# Patient Record
Sex: MALE | Race: WHITE
[De-identification: names, ages, dates, MRNs, and addresses within clinical notes are randomized per-mention and may not be internally consistent; named-entity substitution may affect disease eponyms.]

---

## 2023-06-29 ENCOUNTER — HOSPITAL ENCOUNTER (INPATIENT)
Dept: HOSPITAL 15 - ER | Age: 63
LOS: 6 days | Discharge: HOME HEALTH | DRG: 844 | End: 2023-07-05
Attending: INTERNAL MEDICINE | Admitting: NURSE PRACTITIONER
Payer: MEDICAID

## 2023-06-29 VITALS — BODY MASS INDEX: 19.24 KG/M2 | HEIGHT: 74 IN | WEIGHT: 149.91 LBS

## 2023-06-29 DIAGNOSIS — L97.529: ICD-10-CM

## 2023-06-29 DIAGNOSIS — D64.9: ICD-10-CM

## 2023-06-29 DIAGNOSIS — Z88.8: ICD-10-CM

## 2023-06-29 DIAGNOSIS — D75.839: ICD-10-CM

## 2023-06-29 DIAGNOSIS — E88.09: ICD-10-CM

## 2023-06-29 DIAGNOSIS — T25.022A: Primary | ICD-10-CM
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
## 2023-06-29 DIAGNOSIS — E44.0: ICD-10-CM

## 2023-06-29 DIAGNOSIS — F17.200: ICD-10-CM

## 2023-06-29 DIAGNOSIS — L03.116: ICD-10-CM
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
## 2023-06-29 LAB
ALBUMIN SERPL-MCNC: 2.5 G/DL (ref 3.4–5)
ALP SERPL-CCNC: 98 U/L (ref 45–117)
ALT SERPL-CCNC: 16 U/L (ref 16–61)
ANION GAP SERPL CALCULATED.3IONS-SCNC: 7 MMOL/L (ref 5–15)
BILIRUB SERPL-MCNC: 0.1 MG/DL (ref 0.2–1)
BUN SERPL-MCNC: 13 MG/DL (ref 7–18)
BUN/CREAT SERPL: 15.7 (ref 10–20)
CALCIUM SERPL-MCNC: 7.8 MG/DL (ref 8.5–10.1)
CHLORIDE SERPL-SCNC: 102 MMOL/L (ref 98–107)
CO2 SERPL-SCNC: 26 MMOL/L (ref 21–32)
GLUCOSE SERPL-MCNC: 103 MG/DL (ref 74–106)
HCT VFR BLD AUTO: 29.1 % (ref 41–53)
HGB BLD-MCNC: 9.8 G/DL (ref 13.5–17.5)
MCH RBC QN AUTO: 29.1 PG (ref 28–32)
MCV RBC AUTO: 86.2 FL (ref 80–100)
NRBC BLD QL AUTO: 0 %
POTASSIUM SERPL-SCNC: 3.9 MMOL/L (ref 3.5–5.1)
PROT SERPL-MCNC: 6.8 G/DL (ref 6.4–8.2)
SODIUM SERPL-SCNC: 135 MMOL/L (ref 136–145)

## 2023-06-29 PROCEDURE — 96365 THER/PROPH/DIAG IV INF INIT: CPT

## 2023-06-29 PROCEDURE — 87186 SC STD MICRODIL/AGAR DIL: CPT

## 2023-06-29 PROCEDURE — 87077 CULTURE AEROBIC IDENTIFY: CPT

## 2023-06-29 PROCEDURE — 96366 THER/PROPH/DIAG IV INF ADDON: CPT

## 2023-06-29 PROCEDURE — 82565 ASSAY OF CREATININE: CPT

## 2023-06-29 PROCEDURE — 73630 X-RAY EXAM OF FOOT: CPT

## 2023-06-29 PROCEDURE — 80053 COMPREHEN METABOLIC PANEL: CPT

## 2023-06-29 PROCEDURE — 36415 COLL VENOUS BLD VENIPUNCTURE: CPT

## 2023-06-29 PROCEDURE — 80202 ASSAY OF VANCOMYCIN: CPT

## 2023-06-29 PROCEDURE — 87205 SMEAR GRAM STAIN: CPT

## 2023-06-29 PROCEDURE — 93971 EXTREMITY STUDY: CPT

## 2023-06-29 PROCEDURE — 96368 THER/DIAG CONCURRENT INF: CPT

## 2023-06-29 PROCEDURE — 85025 COMPLETE CBC W/AUTO DIFF WBC: CPT

## 2023-06-29 RX ADMIN — SODIUM CHLORIDE SCH MLS/HR: 0.9 INJECTION, SOLUTION INTRAVENOUS at 22:06

## 2023-06-30 VITALS — SYSTOLIC BLOOD PRESSURE: 166 MMHG | DIASTOLIC BLOOD PRESSURE: 98 MMHG

## 2023-06-30 VITALS — DIASTOLIC BLOOD PRESSURE: 76 MMHG | SYSTOLIC BLOOD PRESSURE: 129 MMHG

## 2023-06-30 VITALS — SYSTOLIC BLOOD PRESSURE: 149 MMHG | DIASTOLIC BLOOD PRESSURE: 96 MMHG

## 2023-06-30 VITALS — DIASTOLIC BLOOD PRESSURE: 78 MMHG | SYSTOLIC BLOOD PRESSURE: 129 MMHG

## 2023-06-30 VITALS — SYSTOLIC BLOOD PRESSURE: 125 MMHG | DIASTOLIC BLOOD PRESSURE: 78 MMHG

## 2023-06-30 VITALS — DIASTOLIC BLOOD PRESSURE: 96 MMHG | SYSTOLIC BLOOD PRESSURE: 149 MMHG

## 2023-06-30 LAB
ALBUMIN SERPL-MCNC: 2.5 G/DL (ref 3.4–5)
ALP SERPL-CCNC: 81 U/L (ref 45–117)
ALT SERPL-CCNC: 13 U/L (ref 16–61)
ANION GAP SERPL CALCULATED.3IONS-SCNC: 5 MMOL/L (ref 5–15)
BILIRUB SERPL-MCNC: 0.2 MG/DL (ref 0.2–1)
BUN SERPL-MCNC: 10 MG/DL (ref 7–18)
BUN/CREAT SERPL: 13.5 (ref 10–20)
CALCIUM SERPL-MCNC: 7.7 MG/DL (ref 8.5–10.1)
CHLORIDE SERPL-SCNC: 106 MMOL/L (ref 98–107)
CO2 SERPL-SCNC: 26 MMOL/L (ref 21–32)
GLUCOSE SERPL-MCNC: 122 MG/DL (ref 74–106)
HCT VFR BLD AUTO: 29.8 % (ref 41–53)
HGB BLD-MCNC: 9.8 G/DL (ref 13.5–17.5)
MCH RBC QN AUTO: 29.1 PG (ref 28–32)
MCV RBC AUTO: 88.1 FL (ref 80–100)
NRBC BLD QL AUTO: 0 %
POTASSIUM SERPL-SCNC: 4.4 MMOL/L (ref 3.5–5.1)
PROT SERPL-MCNC: 5.9 G/DL (ref 6.4–8.2)
SODIUM SERPL-SCNC: 137 MMOL/L (ref 136–145)

## 2023-06-30 RX ADMIN — HYDRALAZINE HYDROCHLORIDE PRN MG: 20 INJECTION INTRAMUSCULAR; INTRAVENOUS at 02:43

## 2023-06-30 RX ADMIN — CEFEPIME SCH MLS/HR: 1 INJECTION, POWDER, FOR SOLUTION INTRAMUSCULAR; INTRAVENOUS at 21:57

## 2023-06-30 RX ADMIN — SODIUM CHLORIDE SCH MLS/HR: 0.9 INJECTION, SOLUTION INTRAVENOUS at 17:07

## 2023-06-30 RX ADMIN — SODIUM HYPOCHLORITE SCH ML: 1.25 SOLUTION TOPICAL at 22:03

## 2023-06-30 RX ADMIN — ENOXAPARIN SODIUM SCH MG: 40 INJECTION SUBCUTANEOUS at 09:48

## 2023-07-01 VITALS — SYSTOLIC BLOOD PRESSURE: 145 MMHG | DIASTOLIC BLOOD PRESSURE: 91 MMHG

## 2023-07-01 VITALS — SYSTOLIC BLOOD PRESSURE: 110 MMHG | DIASTOLIC BLOOD PRESSURE: 68 MMHG

## 2023-07-01 VITALS — SYSTOLIC BLOOD PRESSURE: 125 MMHG | DIASTOLIC BLOOD PRESSURE: 88 MMHG

## 2023-07-01 VITALS — DIASTOLIC BLOOD PRESSURE: 91 MMHG | SYSTOLIC BLOOD PRESSURE: 145 MMHG

## 2023-07-01 VITALS — DIASTOLIC BLOOD PRESSURE: 102 MMHG | SYSTOLIC BLOOD PRESSURE: 157 MMHG

## 2023-07-01 VITALS — SYSTOLIC BLOOD PRESSURE: 141 MMHG | DIASTOLIC BLOOD PRESSURE: 90 MMHG

## 2023-07-01 VITALS — DIASTOLIC BLOOD PRESSURE: 88 MMHG | SYSTOLIC BLOOD PRESSURE: 125 MMHG

## 2023-07-01 RX ADMIN — SODIUM HYPOCHLORITE SCH ML: 1.25 SOLUTION TOPICAL at 10:00

## 2023-07-01 RX ADMIN — SODIUM CHLORIDE SCH MLS/HR: 0.9 INJECTION, SOLUTION INTRAVENOUS at 06:50

## 2023-07-01 RX ADMIN — SODIUM CHLORIDE SCH MLS/HR: 0.9 INJECTION, SOLUTION INTRAVENOUS at 23:43

## 2023-07-01 RX ADMIN — HYDRALAZINE HYDROCHLORIDE PRN MG: 20 INJECTION INTRAMUSCULAR; INTRAVENOUS at 06:11

## 2023-07-01 RX ADMIN — VANCOMYCIN HYDROCHLORIDE SCH MLS/HR: 1 INJECTION, POWDER, LYOPHILIZED, FOR SOLUTION INTRAVENOUS at 04:32

## 2023-07-01 RX ADMIN — ENOXAPARIN SODIUM SCH MG: 40 INJECTION SUBCUTANEOUS at 10:42

## 2023-07-01 RX ADMIN — CEFEPIME SCH MLS/HR: 1 INJECTION, POWDER, FOR SOLUTION INTRAMUSCULAR; INTRAVENOUS at 14:33

## 2023-07-01 RX ADMIN — CEFEPIME SCH MLS/HR: 1 INJECTION, POWDER, FOR SOLUTION INTRAMUSCULAR; INTRAVENOUS at 22:15

## 2023-07-01 RX ADMIN — CEFEPIME SCH MLS/HR: 1 INJECTION, POWDER, FOR SOLUTION INTRAMUSCULAR; INTRAVENOUS at 06:13

## 2023-07-01 RX ADMIN — VANCOMYCIN HYDROCHLORIDE SCH MLS/HR: 1 INJECTION, POWDER, LYOPHILIZED, FOR SOLUTION INTRAVENOUS at 17:49

## 2023-07-01 RX ADMIN — SODIUM HYPOCHLORITE SCH ML: 1.25 SOLUTION TOPICAL at 22:15

## 2023-07-02 VITALS — DIASTOLIC BLOOD PRESSURE: 94 MMHG | SYSTOLIC BLOOD PRESSURE: 137 MMHG

## 2023-07-02 VITALS — DIASTOLIC BLOOD PRESSURE: 87 MMHG | SYSTOLIC BLOOD PRESSURE: 120 MMHG

## 2023-07-02 VITALS — SYSTOLIC BLOOD PRESSURE: 120 MMHG | DIASTOLIC BLOOD PRESSURE: 87 MMHG

## 2023-07-02 VITALS — SYSTOLIC BLOOD PRESSURE: 135 MMHG | DIASTOLIC BLOOD PRESSURE: 81 MMHG

## 2023-07-02 VITALS — DIASTOLIC BLOOD PRESSURE: 91 MMHG | SYSTOLIC BLOOD PRESSURE: 136 MMHG

## 2023-07-02 RX ADMIN — VANCOMYCIN HYDROCHLORIDE SCH MLS/HR: 1 INJECTION, POWDER, LYOPHILIZED, FOR SOLUTION INTRAVENOUS at 10:13

## 2023-07-02 RX ADMIN — VANCOMYCIN HYDROCHLORIDE SCH MLS/HR: 1 INJECTION, POWDER, LYOPHILIZED, FOR SOLUTION INTRAVENOUS at 05:00

## 2023-07-02 RX ADMIN — SODIUM HYPOCHLORITE SCH ML: 1.25 SOLUTION TOPICAL at 22:00

## 2023-07-02 RX ADMIN — CEFEPIME SCH MLS/HR: 1 INJECTION, POWDER, FOR SOLUTION INTRAMUSCULAR; INTRAVENOUS at 05:43

## 2023-07-02 RX ADMIN — ENOXAPARIN SODIUM SCH MG: 40 INJECTION SUBCUTANEOUS at 10:13

## 2023-07-02 RX ADMIN — CEFEPIME SCH MLS/HR: 1 INJECTION, POWDER, FOR SOLUTION INTRAMUSCULAR; INTRAVENOUS at 15:00

## 2023-07-02 RX ADMIN — VANCOMYCIN HYDROCHLORIDE SCH MLS/HR: 1 INJECTION, POWDER, LYOPHILIZED, FOR SOLUTION INTRAVENOUS at 22:06

## 2023-07-02 RX ADMIN — CEFEPIME SCH MLS/HR: 1 INJECTION, POWDER, FOR SOLUTION INTRAMUSCULAR; INTRAVENOUS at 23:12

## 2023-07-02 RX ADMIN — SODIUM HYPOCHLORITE SCH ML: 1.25 SOLUTION TOPICAL at 10:00

## 2023-07-02 RX ADMIN — SODIUM CHLORIDE SCH MLS/HR: 0.9 INJECTION, SOLUTION INTRAVENOUS at 16:10

## 2023-07-03 VITALS — SYSTOLIC BLOOD PRESSURE: 152 MMHG | DIASTOLIC BLOOD PRESSURE: 102 MMHG

## 2023-07-03 VITALS — DIASTOLIC BLOOD PRESSURE: 96 MMHG | SYSTOLIC BLOOD PRESSURE: 153 MMHG

## 2023-07-03 VITALS — DIASTOLIC BLOOD PRESSURE: 102 MMHG | SYSTOLIC BLOOD PRESSURE: 147 MMHG

## 2023-07-03 VITALS — DIASTOLIC BLOOD PRESSURE: 93 MMHG | SYSTOLIC BLOOD PRESSURE: 133 MMHG

## 2023-07-03 VITALS — SYSTOLIC BLOOD PRESSURE: 129 MMHG | DIASTOLIC BLOOD PRESSURE: 93 MMHG

## 2023-07-03 RX ADMIN — SODIUM CHLORIDE SCH MLS/HR: 0.9 INJECTION, SOLUTION INTRAVENOUS at 10:46

## 2023-07-03 RX ADMIN — CEFEPIME SCH MLS/HR: 1 INJECTION, POWDER, FOR SOLUTION INTRAMUSCULAR; INTRAVENOUS at 15:14

## 2023-07-03 RX ADMIN — ENOXAPARIN SODIUM SCH MG: 40 INJECTION SUBCUTANEOUS at 10:47

## 2023-07-03 RX ADMIN — SODIUM HYPOCHLORITE SCH ML: 1.25 SOLUTION TOPICAL at 10:00

## 2023-07-03 RX ADMIN — CEFEPIME SCH MLS/HR: 1 INJECTION, POWDER, FOR SOLUTION INTRAMUSCULAR; INTRAVENOUS at 05:57

## 2023-07-03 RX ADMIN — VANCOMYCIN HYDROCHLORIDE SCH MLS/HR: 1 INJECTION, POWDER, LYOPHILIZED, FOR SOLUTION INTRAVENOUS at 10:46

## 2023-07-03 RX ADMIN — VANCOMYCIN HYDROCHLORIDE SCH MLS/HR: 1 INJECTION, POWDER, LYOPHILIZED, FOR SOLUTION INTRAVENOUS at 23:18

## 2023-07-04 VITALS — DIASTOLIC BLOOD PRESSURE: 89 MMHG | SYSTOLIC BLOOD PRESSURE: 143 MMHG

## 2023-07-04 VITALS — SYSTOLIC BLOOD PRESSURE: 138 MMHG | DIASTOLIC BLOOD PRESSURE: 79 MMHG

## 2023-07-04 VITALS — SYSTOLIC BLOOD PRESSURE: 140 MMHG | DIASTOLIC BLOOD PRESSURE: 91 MMHG

## 2023-07-04 VITALS — DIASTOLIC BLOOD PRESSURE: 79 MMHG | SYSTOLIC BLOOD PRESSURE: 138 MMHG

## 2023-07-04 VITALS — DIASTOLIC BLOOD PRESSURE: 97 MMHG | SYSTOLIC BLOOD PRESSURE: 148 MMHG

## 2023-07-04 VITALS — DIASTOLIC BLOOD PRESSURE: 91 MMHG | SYSTOLIC BLOOD PRESSURE: 148 MMHG

## 2023-07-04 RX ADMIN — Medication SCH GM: at 17:20

## 2023-07-04 RX ADMIN — SODIUM CHLORIDE SCH MLS/HR: 0.9 INJECTION, SOLUTION INTRAVENOUS at 01:30

## 2023-07-04 RX ADMIN — CEFEPIME SCH MLS/HR: 1 INJECTION, POWDER, FOR SOLUTION INTRAMUSCULAR; INTRAVENOUS at 13:15

## 2023-07-04 RX ADMIN — SODIUM CHLORIDE SCH MLS/HR: 0.9 INJECTION, SOLUTION INTRAVENOUS at 18:23

## 2023-07-04 RX ADMIN — CEFEPIME SCH MLS/HR: 1 INJECTION, POWDER, FOR SOLUTION INTRAMUSCULAR; INTRAVENOUS at 21:24

## 2023-07-04 RX ADMIN — CEFEPIME SCH MLS/HR: 1 INJECTION, POWDER, FOR SOLUTION INTRAMUSCULAR; INTRAVENOUS at 03:24

## 2023-07-04 RX ADMIN — VANCOMYCIN HYDROCHLORIDE SCH MLS/HR: 1 INJECTION, POWDER, LYOPHILIZED, FOR SOLUTION INTRAVENOUS at 21:24

## 2023-07-04 RX ADMIN — SODIUM HYPOCHLORITE SCH ML: 1.25 SOLUTION TOPICAL at 00:06

## 2023-07-04 RX ADMIN — ENOXAPARIN SODIUM SCH MG: 40 INJECTION SUBCUTANEOUS at 09:33

## 2023-07-04 RX ADMIN — SODIUM HYPOCHLORITE SCH ML: 1.25 SOLUTION TOPICAL at 21:24

## 2023-07-04 RX ADMIN — VANCOMYCIN HYDROCHLORIDE SCH MLS/HR: 1 INJECTION, POWDER, LYOPHILIZED, FOR SOLUTION INTRAVENOUS at 11:38

## 2023-07-04 RX ADMIN — SODIUM HYPOCHLORITE SCH ML: 1.25 SOLUTION TOPICAL at 09:33

## 2023-07-05 VITALS — DIASTOLIC BLOOD PRESSURE: 88 MMHG | SYSTOLIC BLOOD PRESSURE: 131 MMHG

## 2023-07-05 VITALS — SYSTOLIC BLOOD PRESSURE: 135 MMHG | DIASTOLIC BLOOD PRESSURE: 92 MMHG

## 2023-07-05 VITALS — SYSTOLIC BLOOD PRESSURE: 154 MMHG | DIASTOLIC BLOOD PRESSURE: 97 MMHG

## 2023-07-05 RX ADMIN — CEFEPIME SCH MLS/HR: 1 INJECTION, POWDER, FOR SOLUTION INTRAMUSCULAR; INTRAVENOUS at 05:37

## 2023-07-05 RX ADMIN — CEFEPIME SCH MLS/HR: 1 INJECTION, POWDER, FOR SOLUTION INTRAMUSCULAR; INTRAVENOUS at 13:03

## 2023-07-05 RX ADMIN — SODIUM HYPOCHLORITE SCH ML: 1.25 SOLUTION TOPICAL at 10:00

## 2023-07-05 RX ADMIN — VANCOMYCIN HYDROCHLORIDE SCH MLS/HR: 1 INJECTION, POWDER, LYOPHILIZED, FOR SOLUTION INTRAVENOUS at 10:00

## 2023-07-05 RX ADMIN — ENOXAPARIN SODIUM SCH MG: 40 INJECTION SUBCUTANEOUS at 10:00

## 2023-07-05 RX ADMIN — Medication SCH GM: at 17:06

## 2023-07-05 RX ADMIN — Medication SCH GM: at 08:00

## 2023-07-05 RX ADMIN — SODIUM CHLORIDE SCH MLS/HR: 0.9 INJECTION, SOLUTION INTRAVENOUS at 05:49

## 2023-10-02 ENCOUNTER — HOSPITAL ENCOUNTER (INPATIENT)
Dept: HOSPITAL 15 - ER | Age: 63
LOS: 4 days | Discharge: HOME | DRG: 383 | End: 2023-10-06
Attending: FAMILY MEDICINE
Payer: MEDICAID

## 2023-10-02 VITALS — RESPIRATION RATE: 14 BRPM | OXYGEN SATURATION: 97 % | HEART RATE: 84 BPM

## 2023-10-02 VITALS — HEIGHT: 72 IN | BODY MASS INDEX: 20.39 KG/M2 | WEIGHT: 150.58 LBS

## 2023-10-02 DIAGNOSIS — D64.9: ICD-10-CM

## 2023-10-02 DIAGNOSIS — I10: ICD-10-CM

## 2023-10-02 DIAGNOSIS — L03.116: Primary | ICD-10-CM
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
## 2023-10-02 DIAGNOSIS — Z60.2: ICD-10-CM

## 2023-10-02 DIAGNOSIS — E44.0: ICD-10-CM

## 2023-10-02 DIAGNOSIS — Z82.49: ICD-10-CM
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
## 2023-10-02 DIAGNOSIS — F17.210: ICD-10-CM

## 2023-10-02 DIAGNOSIS — S91.302A: ICD-10-CM

## 2023-10-02 DIAGNOSIS — Z71.6: ICD-10-CM

## 2023-10-02 DIAGNOSIS — B96.4: ICD-10-CM
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
## 2023-10-02 LAB
ALBUMIN SERPL-MCNC: 4.2 G/DL (ref 3.2–4.8)
ALP SERPL-CCNC: 90 U/L (ref 46–116)
ALT SERPL-CCNC: < 9 U/L (ref 7–40)
ANION GAP SERPL CALCULATED.3IONS-SCNC: 5 MMOL/L (ref 5–15)
BILIRUB SERPL-MCNC: 0.2 MG/DL (ref 0.2–1)
BUN SERPL-MCNC: 10 MG/DL (ref 9–23)
BUN/CREAT SERPL: 10.3 (ref 10–20)
CALCIUM SERPL-MCNC: 9.3 MG/DL (ref 8.5–10.1)
CHLORIDE SERPL-SCNC: 102 MMOL/L (ref 98–107)
CO2 SERPL-SCNC: 27 MMOL/L (ref 20–30)
GLUCOSE SERPL-MCNC: 104 MG/DL (ref 74–106)
HCT VFR BLD AUTO: 32.6 % (ref 41–53)
HGB BLD-MCNC: 10.5 G/DL (ref 13.5–17.5)
MCH RBC QN AUTO: 25.6 PG (ref 28–32)
MCV RBC AUTO: 79.4 FL (ref 80–100)
NRBC BLD QL AUTO: 0 %
POTASSIUM SERPL-SCNC: 4.2 MMOL/L (ref 3.5–5.1)
PROT SERPL-MCNC: 7.8 G/DL (ref 5.7–8.2)
SODIUM SERPL-SCNC: 134 MMOL/L (ref 136–145)

## 2023-10-02 PROCEDURE — 81001 URINALYSIS AUTO W/SCOPE: CPT

## 2023-10-02 PROCEDURE — 87186 SC STD MICRODIL/AGAR DIL: CPT

## 2023-10-02 PROCEDURE — 85610 PROTHROMBIN TIME: CPT

## 2023-10-02 PROCEDURE — 85025 COMPLETE CBC W/AUTO DIFF WBC: CPT

## 2023-10-02 PROCEDURE — 71045 X-RAY EXAM CHEST 1 VIEW: CPT

## 2023-10-02 PROCEDURE — 93005 ELECTROCARDIOGRAM TRACING: CPT

## 2023-10-02 PROCEDURE — 73700 CT LOWER EXTREMITY W/O DYE: CPT

## 2023-10-02 PROCEDURE — 87205 SMEAR GRAM STAIN: CPT

## 2023-10-02 PROCEDURE — 93926 LOWER EXTREMITY STUDY: CPT

## 2023-10-02 PROCEDURE — 85652 RBC SED RATE AUTOMATED: CPT

## 2023-10-02 PROCEDURE — 80053 COMPREHEN METABOLIC PANEL: CPT

## 2023-10-02 PROCEDURE — 85730 THROMBOPLASTIN TIME PARTIAL: CPT

## 2023-10-02 PROCEDURE — 36415 COLL VENOUS BLD VENIPUNCTURE: CPT

## 2023-10-02 PROCEDURE — 87077 CULTURE AEROBIC IDENTIFY: CPT

## 2023-10-02 PROCEDURE — 80202 ASSAY OF VANCOMYCIN: CPT

## 2023-10-02 RX ADMIN — SODIUM CHLORIDE SCH MLS/HR: 0.9 INJECTION, SOLUTION INTRAVENOUS at 17:17

## 2023-10-02 RX ADMIN — OXYCODONE HYDROCHLORIDE AND ACETAMINOPHEN SCH MG: 500 TABLET ORAL at 22:22

## 2023-10-02 SDOH — SOCIAL STABILITY - SOCIAL INSECURITY: PROBLEMS RELATED TO LIVING ALONE: Z60.2

## 2023-10-03 VITALS
SYSTOLIC BLOOD PRESSURE: 156 MMHG | DIASTOLIC BLOOD PRESSURE: 92 MMHG | OXYGEN SATURATION: 97 % | TEMPERATURE: 97.4 F | RESPIRATION RATE: 15 BRPM | HEART RATE: 62 BPM

## 2023-10-03 VITALS
HEART RATE: 60 BPM | RESPIRATION RATE: 16 BRPM | TEMPERATURE: 98.5 F | SYSTOLIC BLOOD PRESSURE: 126 MMHG | OXYGEN SATURATION: 99 % | DIASTOLIC BLOOD PRESSURE: 79 MMHG

## 2023-10-03 VITALS
TEMPERATURE: 98 F | HEART RATE: 75 BPM | DIASTOLIC BLOOD PRESSURE: 103 MMHG | RESPIRATION RATE: 18 BRPM | SYSTOLIC BLOOD PRESSURE: 125 MMHG | OXYGEN SATURATION: 97 %

## 2023-10-03 VITALS — RESPIRATION RATE: 18 BRPM | HEART RATE: 66 BPM | OXYGEN SATURATION: 98 %

## 2023-10-03 VITALS — HEART RATE: 63 BPM | SYSTOLIC BLOOD PRESSURE: 148 MMHG | DIASTOLIC BLOOD PRESSURE: 69 MMHG | RESPIRATION RATE: 18 BRPM

## 2023-10-03 VITALS — OXYGEN SATURATION: 97 % | RESPIRATION RATE: 16 BRPM

## 2023-10-03 VITALS — HEART RATE: 62 BPM | RESPIRATION RATE: 15 BRPM | OXYGEN SATURATION: 97 %

## 2023-10-03 LAB
ALBUMIN SERPL-MCNC: 3.8 G/DL (ref 3.2–4.8)
ALP SERPL-CCNC: 88 U/L (ref 46–116)
ALT SERPL-CCNC: < 9 U/L (ref 7–40)
ANION GAP SERPL CALCULATED.3IONS-SCNC: 1 MMOL/L (ref 5–15)
BILIRUB SERPL-MCNC: 0.2 MG/DL (ref 0.2–1)
BUN SERPL-MCNC: 9 MG/DL (ref 9–23)
BUN/CREAT SERPL: 11 (ref 10–20)
CALCIUM SERPL-MCNC: 8.7 MG/DL (ref 8.7–10.4)
CHLORIDE SERPL-SCNC: 108 MMOL/L (ref 98–107)
CO2 SERPL-SCNC: 30 MMOL/L (ref 20–30)
GLUCOSE SERPL-MCNC: 88 MG/DL (ref 74–106)
HCT VFR BLD AUTO: 32.6 % (ref 41–53)
HGB BLD-MCNC: 10.6 G/DL (ref 13.5–17.5)
MCH RBC QN AUTO: 26.1 PG (ref 28–32)
MCV RBC AUTO: 80 FL (ref 80–100)
NRBC BLD QL AUTO: 0 %
POTASSIUM SERPL-SCNC: 4.2 MMOL/L (ref 3.5–5.1)
PROT SERPL-MCNC: 6.9 G/DL (ref 5.7–8.2)
SODIUM SERPL-SCNC: 139 MMOL/L (ref 136–145)

## 2023-10-03 RX ADMIN — CEFAZOLIN SODIUM SCH MLS/HR: 1 INJECTION, SOLUTION INTRAVENOUS at 21:43

## 2023-10-03 RX ADMIN — VANCOMYCIN HYDROCHLORIDE SCH MLS/HR: 750 INJECTION, POWDER, LYOPHILIZED, FOR SOLUTION INTRAVENOUS at 08:57

## 2023-10-03 RX ADMIN — ENOXAPARIN SODIUM SCH MG: 40 INJECTION SUBCUTANEOUS at 08:55

## 2023-10-03 RX ADMIN — SODIUM CHLORIDE SCH MLS/HR: 0.9 INJECTION, SOLUTION INTRAVENOUS at 01:06

## 2023-10-03 RX ADMIN — SODIUM CHLORIDE SCH MLS/HR: 0.9 INJECTION, SOLUTION INTRAVENOUS at 03:31

## 2023-10-03 RX ADMIN — BENAZEPRIL HYDROCHLORIDE SCH MG: 10 TABLET, FILM COATED ORAL at 08:52

## 2023-10-03 RX ADMIN — VANCOMYCIN HYDROCHLORIDE SCH MLS/HR: 750 INJECTION, POWDER, LYOPHILIZED, FOR SOLUTION INTRAVENOUS at 20:08

## 2023-10-03 RX ADMIN — MULTIVITAMIN TABLET SCH TAB: TABLET at 08:51

## 2023-10-03 RX ADMIN — SODIUM CHLORIDE SCH MLS/HR: 0.9 INJECTION, SOLUTION INTRAVENOUS at 14:24

## 2023-10-03 RX ADMIN — OXYCODONE HYDROCHLORIDE AND ACETAMINOPHEN SCH MG: 500 TABLET ORAL at 21:43

## 2023-10-03 RX ADMIN — OXYCODONE HYDROCHLORIDE AND ACETAMINOPHEN SCH MG: 500 TABLET ORAL at 08:51

## 2023-10-03 RX ADMIN — CEFAZOLIN SODIUM SCH MLS/HR: 1 INJECTION, SOLUTION INTRAVENOUS at 14:26

## 2023-10-03 RX ADMIN — ZINC SULFATE CAP 220 MG (50 MG ELEMENTAL ZN) SCH MG: 220 (50 ZN) CAP at 08:51

## 2023-10-04 VITALS
RESPIRATION RATE: 20 BRPM | OXYGEN SATURATION: 100 % | TEMPERATURE: 98.7 F | SYSTOLIC BLOOD PRESSURE: 139 MMHG | HEART RATE: 85 BPM | DIASTOLIC BLOOD PRESSURE: 99 MMHG

## 2023-10-04 VITALS
OXYGEN SATURATION: 96 % | RESPIRATION RATE: 20 BRPM | TEMPERATURE: 98.4 F | SYSTOLIC BLOOD PRESSURE: 139 MMHG | HEART RATE: 79 BPM | DIASTOLIC BLOOD PRESSURE: 78 MMHG

## 2023-10-04 VITALS — OXYGEN SATURATION: 100 %

## 2023-10-04 LAB
ALBUMIN SERPL-MCNC: 3.3 G/DL (ref 3.2–4.8)
ALP SERPL-CCNC: 67 U/L (ref 46–116)
ALT SERPL-CCNC: < 9 U/L (ref 7–40)
ANION GAP SERPL CALCULATED.3IONS-SCNC: 6 MMOL/L (ref 5–15)
APTT PPP: 28.6 SEC (ref 24.5–34.5)
BILIRUB SERPL-MCNC: 0.2 MG/DL (ref 0.2–1)
BUN SERPL-MCNC: 14 MG/DL (ref 9–23)
BUN/CREAT SERPL: 17.3 (ref 10–20)
CALCIUM SERPL-MCNC: 8.4 MG/DL (ref 8.5–10.1)
CHLORIDE SERPL-SCNC: 110 MMOL/L (ref 98–107)
CO2 SERPL-SCNC: 24 MMOL/L (ref 20–30)
GLUCOSE SERPL-MCNC: 93 MG/DL (ref 74–106)
HCT VFR BLD AUTO: 32.5 % (ref 41–53)
HGB BLD-MCNC: 10.2 G/DL (ref 13.5–17.5)
INR PPP: 1.1 (ref 0.9–1.15)
MCH RBC QN AUTO: 25.5 PG (ref 28–32)
MCV RBC AUTO: 81.1 FL (ref 80–100)
NRBC BLD QL AUTO: 0 %
POTASSIUM SERPL-SCNC: 4.3 MMOL/L (ref 3.5–5.1)
PROT SERPL-MCNC: 5.8 G/DL (ref 5.7–8.2)
PROTHROMBIN TIME: 11.5 SEC (ref 9.3–11.8)
SODIUM SERPL-SCNC: 140 MMOL/L (ref 136–145)

## 2023-10-04 PROCEDURE — 0HBNXZZ EXCISION OF LEFT FOOT SKIN, EXTERNAL APPROACH: ICD-10-PCS | Performed by: PODIATRIST

## 2023-10-04 RX ADMIN — ENOXAPARIN SODIUM SCH MG: 40 INJECTION SUBCUTANEOUS at 09:59

## 2023-10-04 RX ADMIN — SODIUM CHLORIDE SCH MLS/HR: 0.9 INJECTION, SOLUTION INTRAVENOUS at 10:05

## 2023-10-04 RX ADMIN — OXYCODONE HYDROCHLORIDE AND ACETAMINOPHEN SCH MG: 500 TABLET ORAL at 20:26

## 2023-10-04 RX ADMIN — CEFAZOLIN SODIUM SCH MLS/HR: 1 INJECTION, SOLUTION INTRAVENOUS at 21:48

## 2023-10-04 RX ADMIN — VANCOMYCIN HYDROCHLORIDE SCH MLS/HR: 750 INJECTION, POWDER, LYOPHILIZED, FOR SOLUTION INTRAVENOUS at 20:26

## 2023-10-04 RX ADMIN — CEFAZOLIN SODIUM SCH MLS/HR: 1 INJECTION, SOLUTION INTRAVENOUS at 05:40

## 2023-10-04 RX ADMIN — SODIUM CHLORIDE SCH MLS/HR: 0.9 INJECTION, SOLUTION INTRAVENOUS at 18:45

## 2023-10-04 RX ADMIN — OXYCODONE HYDROCHLORIDE AND ACETAMINOPHEN SCH MG: 500 TABLET ORAL at 09:58

## 2023-10-04 RX ADMIN — CEFAZOLIN SODIUM SCH MLS/HR: 1 INJECTION, SOLUTION INTRAVENOUS at 14:19

## 2023-10-04 RX ADMIN — ZINC SULFATE CAP 220 MG (50 MG ELEMENTAL ZN) SCH MG: 220 (50 ZN) CAP at 09:58

## 2023-10-04 RX ADMIN — SODIUM CHLORIDE SCH MLS/HR: 0.9 INJECTION, SOLUTION INTRAVENOUS at 00:44

## 2023-10-04 RX ADMIN — MULTIVITAMIN TABLET SCH TAB: TABLET at 09:58

## 2023-10-04 RX ADMIN — BENAZEPRIL HYDROCHLORIDE SCH MG: 10 TABLET, FILM COATED ORAL at 09:58

## 2023-10-04 RX ADMIN — VANCOMYCIN HYDROCHLORIDE SCH MLS/HR: 750 INJECTION, POWDER, LYOPHILIZED, FOR SOLUTION INTRAVENOUS at 08:45

## 2023-10-05 VITALS
TEMPERATURE: 98 F | OXYGEN SATURATION: 97 % | HEART RATE: 103 BPM | RESPIRATION RATE: 18 BRPM | SYSTOLIC BLOOD PRESSURE: 137 MMHG | DIASTOLIC BLOOD PRESSURE: 89 MMHG

## 2023-10-05 VITALS — HEART RATE: 96 BPM | RESPIRATION RATE: 18 BRPM | OXYGEN SATURATION: 99 %

## 2023-10-05 RX ADMIN — OXYCODONE HYDROCHLORIDE AND ACETAMINOPHEN SCH MG: 500 TABLET ORAL at 23:07

## 2023-10-05 RX ADMIN — CEFAZOLIN SODIUM SCH MLS/HR: 1 INJECTION, SOLUTION INTRAVENOUS at 05:02

## 2023-10-05 RX ADMIN — SODIUM CHLORIDE SCH MLS/HR: 0.9 INJECTION, SOLUTION INTRAVENOUS at 20:31

## 2023-10-05 RX ADMIN — SODIUM CHLORIDE SCH MLS/HR: 0.9 INJECTION, SOLUTION INTRAVENOUS at 05:02

## 2023-10-05 RX ADMIN — VANCOMYCIN HYDROCHLORIDE SCH MLS/HR: 750 INJECTION, POWDER, LYOPHILIZED, FOR SOLUTION INTRAVENOUS at 10:45

## 2023-10-05 RX ADMIN — BENAZEPRIL HYDROCHLORIDE SCH MG: 10 TABLET, FILM COATED ORAL at 10:32

## 2023-10-05 RX ADMIN — ZINC SULFATE CAP 220 MG (50 MG ELEMENTAL ZN) SCH MG: 220 (50 ZN) CAP at 10:45

## 2023-10-05 RX ADMIN — CEFAZOLIN SODIUM SCH MLS/HR: 1 INJECTION, SOLUTION INTRAVENOUS at 14:36

## 2023-10-05 RX ADMIN — ENOXAPARIN SODIUM SCH MG: 40 INJECTION SUBCUTANEOUS at 10:45

## 2023-10-05 RX ADMIN — MULTIVITAMIN TABLET SCH TAB: TABLET at 10:45

## 2023-10-05 RX ADMIN — VANCOMYCIN HYDROCHLORIDE SCH MLS/HR: 750 INJECTION, POWDER, LYOPHILIZED, FOR SOLUTION INTRAVENOUS at 20:31

## 2023-10-05 RX ADMIN — BENAZEPRIL HYDROCHLORIDE SCH MG: 10 TABLET, FILM COATED ORAL at 15:42

## 2023-10-05 RX ADMIN — OXYCODONE HYDROCHLORIDE AND ACETAMINOPHEN SCH MG: 500 TABLET ORAL at 10:45

## 2023-10-05 RX ADMIN — SODIUM CHLORIDE SCH MLS/HR: 0.9 INJECTION, SOLUTION INTRAVENOUS at 11:25

## 2023-10-05 RX ADMIN — CEFAZOLIN SODIUM SCH MLS/HR: 1 INJECTION, SOLUTION INTRAVENOUS at 23:07

## 2023-10-06 VITALS — TEMPERATURE: 97.88 F | SYSTOLIC BLOOD PRESSURE: 150 MMHG | DIASTOLIC BLOOD PRESSURE: 63 MMHG

## 2023-10-06 VITALS — OXYGEN SATURATION: 97 % | HEART RATE: 89 BPM | RESPIRATION RATE: 17 BRPM

## 2023-10-06 VITALS
OXYGEN SATURATION: 99 % | HEART RATE: 84 BPM | DIASTOLIC BLOOD PRESSURE: 81 MMHG | TEMPERATURE: 97.9 F | SYSTOLIC BLOOD PRESSURE: 127 MMHG | RESPIRATION RATE: 18 BRPM

## 2023-10-06 VITALS
SYSTOLIC BLOOD PRESSURE: 129 MMHG | RESPIRATION RATE: 17 BRPM | TEMPERATURE: 98.4 F | OXYGEN SATURATION: 97 % | HEART RATE: 94 BPM | DIASTOLIC BLOOD PRESSURE: 84 MMHG

## 2023-10-06 RX ADMIN — OXYCODONE HYDROCHLORIDE AND ACETAMINOPHEN SCH MG: 500 TABLET ORAL at 10:05

## 2023-10-06 RX ADMIN — VANCOMYCIN HYDROCHLORIDE SCH MLS/HR: 750 INJECTION, POWDER, LYOPHILIZED, FOR SOLUTION INTRAVENOUS at 08:01

## 2023-10-06 RX ADMIN — BENAZEPRIL HYDROCHLORIDE SCH MG: 10 TABLET, FILM COATED ORAL at 10:07

## 2023-10-06 RX ADMIN — CEFAZOLIN SODIUM SCH MLS/HR: 1 INJECTION, SOLUTION INTRAVENOUS at 07:11

## 2023-10-06 RX ADMIN — ENOXAPARIN SODIUM SCH MG: 40 INJECTION SUBCUTANEOUS at 10:00

## 2023-10-06 RX ADMIN — SODIUM CHLORIDE SCH MLS/HR: 0.9 INJECTION, SOLUTION INTRAVENOUS at 04:34

## 2023-10-06 RX ADMIN — SODIUM CHLORIDE SCH MLS/HR: 0.9 INJECTION, SOLUTION INTRAVENOUS at 12:30

## 2023-10-06 RX ADMIN — MULTIVITAMIN TABLET SCH TAB: TABLET at 10:06

## 2023-10-06 RX ADMIN — ZINC SULFATE CAP 220 MG (50 MG ELEMENTAL ZN) SCH MG: 220 (50 ZN) CAP at 10:06

## 2024-12-02 ENCOUNTER — HOSPITAL ENCOUNTER (EMERGENCY)
Dept: HOSPITAL 15 - ER | Age: 64
LOS: 1 days | Discharge: HOME | End: 2024-12-03
Payer: MEDICAID

## 2024-12-02 VITALS — RESPIRATION RATE: 16 BRPM | HEART RATE: 89 BPM | TEMPERATURE: 98 F | OXYGEN SATURATION: 96 %

## 2024-12-02 VITALS — HEIGHT: 66 IN | WEIGHT: 132.28 LBS | BODY MASS INDEX: 21.26 KG/M2

## 2024-12-02 VITALS — RESPIRATION RATE: 16 BRPM | OXYGEN SATURATION: 99 % | HEART RATE: 109 BPM

## 2024-12-02 VITALS — OXYGEN SATURATION: 96 % | HEART RATE: 89 BPM | RESPIRATION RATE: 16 BRPM

## 2024-12-02 DIAGNOSIS — Z79.899: ICD-10-CM

## 2024-12-02 DIAGNOSIS — F15.90: ICD-10-CM

## 2024-12-02 DIAGNOSIS — Z88.6: ICD-10-CM

## 2024-12-02 DIAGNOSIS — F23: Primary | ICD-10-CM

## 2024-12-02 DIAGNOSIS — F17.210: ICD-10-CM

## 2024-12-02 DIAGNOSIS — Z86.2: ICD-10-CM

## 2024-12-02 DIAGNOSIS — I10: ICD-10-CM

## 2024-12-02 LAB
ANION GAP SERPL CALCULATED.3IONS-SCNC: 9 MMOL/L (ref 5–15)
BUN SERPL-MCNC: 19 MG/DL (ref 9–23)
BUN/CREAT SERPL: 17.3 (ref 10–20)
CALCIUM SERPL-MCNC: 10.1 MG/DL (ref 8.7–10.4)
CHLORIDE SERPL-SCNC: 106 MMOL/L (ref 98–107)
CO2 SERPL-SCNC: 24 MMOL/L (ref 20–31)
GLUCOSE SERPL-MCNC: 87 MG/DL (ref 74–106)
HCT VFR BLD AUTO: 40.2 % (ref 41–53)
HGB BLD-MCNC: 13.5 G/DL (ref 13.5–17.5)
MCH RBC QN AUTO: 31.1 PG (ref 28–32)
MCV RBC AUTO: 93.1 FL (ref 80–100)
NRBC BLD QL AUTO: 0.1 %
POTASSIUM SERPL-SCNC: 4.2 MMOL/L (ref 3.5–5.1)
SODIUM SERPL-SCNC: 139 MMOL/L (ref 136–145)

## 2024-12-02 PROCEDURE — 80048 BASIC METABOLIC PNL TOTAL CA: CPT

## 2024-12-02 PROCEDURE — 81001 URINALYSIS AUTO W/SCOPE: CPT

## 2024-12-02 PROCEDURE — 36415 COLL VENOUS BLD VENIPUNCTURE: CPT

## 2024-12-02 PROCEDURE — 80307 DRUG TEST PRSMV CHEM ANLYZR: CPT

## 2024-12-02 PROCEDURE — 96372 THER/PROPH/DIAG INJ SC/IM: CPT

## 2024-12-02 PROCEDURE — 85025 COMPLETE CBC W/AUTO DIFF WBC: CPT

## 2024-12-02 PROCEDURE — 80320 DRUG SCREEN QUANTALCOHOLS: CPT

## 2024-12-02 PROCEDURE — 99285 EMERGENCY DEPT VISIT HI MDM: CPT

## 2024-12-02 RX ADMIN — HALOPERIDOL LACTATE ONE MG: 5 INJECTION, SOLUTION INTRAMUSCULAR at 15:54

## 2024-12-02 RX ADMIN — DIPHENHYDRAMINE HYDROCHLORIDE ONE MG: 50 INJECTION INTRAMUSCULAR; INTRAVENOUS at 15:53

## 2024-12-02 RX ADMIN — LORAZEPAM ONE MG: 2 INJECTION, SOLUTION INTRAMUSCULAR; INTRAVENOUS at 15:51

## 2024-12-02 NOTE — ED.PDOC
History of Present Illness


HPI Comments


65 y/o M, with a Hx of anemia, HTN, and polysubstance abuse, is BIBA for c/o 

ALOC s/p seizure-like activity, today. Per EMS report, patient's neighbor called

after witnessing the patient, suddenly, laid himself next to his vehicle, while 

outside, and proceeded to have a seizure, where his body "stiffen" then. On 

scene, patient was found next to his vehicle, w/o oral trauma or incontinence, 

stating non-comprehensible sentences, with a GCS 11, blood glucose of 144, and 

all remaining vitals within normal limits. EMS states on aforementioned personal

reporting on patient acting different from his normal baseline. Patient has no 

other reported associated symptoms. Further Hx cannot be obtain, due to 

patient's current altered condition and absence of family/caretaker historians, 

at time of assessment.


Chief Complaint:  Seizure


Time Seen by MD:  15:10


Primary Care Provider:  SAMANTHA


Reviewed Notes:  Nurses Notes, Paramedic Notes, Medications, Allergies


Allergies:  


Coded Allergies:  


     Ibuprofen (Verified  Adverse Reaction, Unknown, 11/14/10)


Home Meds


Active Scripts


Acetaminophen (Tylenol) 650 Mg Rc, 650 MG PO TID, #30 SUPP.RECT


   Prov:MARIA ISABEL GOLDBERG MD         10/6/23


Ciprofloxacin Hcl (Cipro) 500 Mg Tab, 1 TAB PO BID, #60 TAB


   Prov:MARIA ISABEL GOLDBERG MD         10/6/23


Amoxicillin & Pot Clavulanate (AUGMENTIN TABLET) 875 Mg Tb, 875 MG PO BID for 10

Days, #20 TAB


   Prov:MIKIE BINGHAM MD         7/5/23


Reported Medications


Benazepril Hcl (Benazepril Hcl) 5 Mg Tab, 5 MG PO DAILY, MG


   10/3/23


Olanzapine (OLANZAPINE) 2.5 Mg Tab, 2 TAB PO DAILY for 30 Days, MG


   10/3/23


Information Source:  Patient, Emergency Med Personnel


Mode of Arrival:  EMS


Severity:  Moderate


Timing:  Hours


Duration:  Minutes


Prehospital treatment:  12 Lead EKG, Accucheck (144), Cardiac Monitor





Past Medical History


PAST MEDICAL HISTORY:  Anemia, HTN


Surgical History:  Denies all surgeries





Family History


Family History:  Family hx of HTN





Social History


Smoker:  Cigarettes


Alcohol:  Occasionally


Drugs:  Marijuana


Lives In:  Home





Constitutional:  denies: chills, diaphoresis, fatigue, fever, malaise, sweats, 

weakness, others


EENTM:  denies: blurred vision, double vision, ear bleeding, ear discharge, ear 

drainage, ear pain, ear ringing, eye pain, eye redness, hearing loss, mouth 

pain, mouth swelling, nasal discharge, nose bleeding, nose congestion, nose 

pain, photophobia, tearing, throat pain, throat swelling, voice changes, others


Respiratory:  denies: cough, hemoptysis, orthopnea, SOB at rest, shortness of 

breath, SOB with excertion, stridor, wheezing, others


Cardiovascular:  denies: chest pain, dizzy spells, diaphoresis, Dyspnea on 

exertion, edema, irregular heart beat, left arm pain, lightheadedness, 

palpitations, PND, syncope, others


Gastrointestinal:  denies: abdomen distended, abdominal pain, blood streaked 

bowels, constipated, diarrhea, dysphagia, difficulty swallowing, hematemesis, 

melena, nausea, poor appetite, poor fluid intake, rectal bleeding, rectal pain, 

vomiting, others


Genitourinary:  denies: burning, dysuria, flank pain, frequency, hematuria, 

incontinence, penile discharge, penile sore, pain, testicle pain, testicle sw

elling, urgency, others


Neurological:  reports: seizure; denies: dizziness, fainting, headache, left 

sided numbness, left sided weakness, numbness, paresthesia, pre-existing 

deficit, right sided numbness, right sided weakness, speech problems, tingling, 

tremors, weakness, others


Musculoskeletal:  denies: back pain, gout, joint pain, joint swelling, muscle 

pain, muscle stiffness, neck pain, others


Integumetry:  denies: bruises, change in color, change in hair/nails, dryness, 

laceration, lesions, lumps, rash, wounds, others


Allergic/Immunocompromised:  denies: Difficulty Healing, Frequent Infections, 

Hives, Itching, others


Hematologic/Lymphatic:  denies: anemia, blood clots, easy bleeding, easy 

bruising, swollen glands, others


Endocrine:  denies: excessive hunger, excessive sweating, excessive thirst, 

excessive urination, flushing, intolerance to cold, intolerance to heat, 

unexplained weight gain, unexplained weight loss, others


Psychiatric:  denies: anxiety, bipolar disorder, depression, hopeless, panic 

disorder, schizophrenia, sleepless, suicidal, others


All Other Systems:  Reviewed and Negative





Physical Exam


General Appearance:  No Apparent Distress


HEENT:  Normal ENT Inspection, Pharynx Normal, TMs Normal


Neck:  Full Range of Motion, Non-Tender, Normal, Normal Inspection


Respiratory:  Chest Non-Tender, Lungs Clear, No Accessory Muscle Use, No 

Respiratory Distress, Normal Breath Sounds


Cardiovascular:  No Edema, No JVD, No Murmur, No Gallop, Normal Peripheral P

ulses, Regular Rate/Rhythm


Breast Exam:  Deferred


Gastrointestinal:  No Organomegaly, Non Tender, No Pulsatile Mass, Normal Bowel 

Sounds, Soft


Genitalia:  Deferred


Pelvic:  Deferred


Rectal:  Deferred


Extremities:  No calf tenderness, Normal capillary refill, No pedal edema


Musculoskeletal :  


   Apperance:  Normal


Neurologic:  Alert, CNs II-XII nml as Tested, No Motor Deficits, Normal Affect, 

Normal Mood, No Sensory Deficits


Cerebellar Function:  Normal


Reflexes:  Normal


Skin:  Dry, Normal Color, Warm


Lymphatic:  No Adenopathy





Was a procedure done?


Was a procedure done?:  No





Differential Dx


Considerations may include:


seizure, pseudoseizure, electrolyte imbalance, UTI, intracranial bleed, CVA, 

TIA, viral syndrome





X-Ray, Labs, Meds, VS





                                   Vital Signs








  Date Time  Temp Pulse Resp B/P (MAP) Pulse Ox O2 Delivery O2 Flow Rate FiO2


 


12/2/24 16:15  102 18 160/100 (120) 98   


 


12/2/24 15:25 97.8 116 18  98   





 97.8       


 


12/2/24 15:25  109 16  99 Room Air* 0 21








                                       Lab








Test


 12/2/24


15:25 Range/Units


 


 


White Blood Count


 10.8 


 4.4-10.8


10^3/uL


 


Red Blood Count


 4.32 L


 4.5-5.90


10^6/uL


 


Hemoglobin 13.5  13.5-17.5  g/dL


 


Hematocrit 40.2 L 41.0-53.0  %


 


Mean Corpuscular Volume 93.1  80.0-100.0  fL


 


Mean Corpuscular Hemoglobin 31.1  28.0-32.0  pg


 


Mean Corpuscular Hemoglobin


Concent 33.5 


 32.0-36.0  g/dL





 


Red Cell Distribution Width 14.2  11.8-14.3  %


 


Platelet Count


 347 


 140-450


10^3/uL


 


Mean Platelet Volume 7.7  6.9-10.8  fL


 


Neutrophils (%) (Auto) 83.3 H 37.0-80.0  %


 


Lymphocytes (%) (Auto) 8.0 L 10.0-50.0  %


 


Monocytes (%) (Auto) 6.6  0.0-12.0  %


 


Eosinophils (%) (Auto) 1.2  0.0-7.0  %


 


Basophils (%) (Auto) 0.9  0.0-2.0  %


 


Neutrophils # (Auto)


 9.0 H


 1.6-8.6  10


^3/uL


 


Lymphocytes # (Auto)


 0.9 


 0.4-5.4  10


^3/uL


 


Monocytes # (Auto) 0.7  0-1.3  10 ^3/uL


 


Eosinophils # (Auto) 0.1  0-0.8  10 ^3/uL


 


Basophils # (Auto) 0.1  0-0.2  10 ^3/uL


 


Nucleated Red Blood Cells 0.1   %


 


Sodium Level 139  136-145  mmol/L


 


Potassium Level 4.2  3.5-5.1  mmol/L


 


Chloride Level 106    mmol/L


 


Carbon Dioxide Level 24  20-31  mmol/L


 


Anion Gap 9  5-15  


 


Blood Urea Nitrogen 19  9-23  mg/dL


 


Creatinine


 1.10 


 0.700-1.30


mg/dL


 


Glomerular Filtration Rate


Calc 75 


 >90  mL/min





 


BUN/Creatinine Ratio 17.3  10.0-20.0  


 


Serum Glucose 87    mg/dL


 


Calcium Level 10.1  8.7-10.4  mg/dL


 


Plasma/Serum Blood Alcohol 4.3  <10  mg/dL








                               Current Medications








 Medications


  (Trade)  Dose


 Ordered  Sig/Jeanie


 Route  Start Time


 Stop Time Status Last Admin


 


 Haloperidol


 Lactate


  (Haldol)  5 mg  ONCE  ONCE


 IM  12/2/24 15:00


 12/2/24 15:02 DC 12/2/24 15:54





 


 Diphenhydramine


 HCl


  (Benadryl


 Injection)  25 mg  ONCE  ONCE


 IM  12/2/24 15:00


 12/2/24 15:02 DC 12/2/24 15:53





 


 Lorazepam


  (Ativan Inj)  1 mg  ONCE  ONCE


 IM  12/2/24 15:00


 12/2/24 15:02 DC 12/2/24 15:51








The patient's CBC and chemistry panel is within normal limits


The patient was given Haldol 5 mg IM


The patient was given Benadryl 25 mg IM


The patient was also given Ativan 1 mg IM


At this time, the patient was resting comfortably in the bed 


We are awaiting the patient's urine tox


The patient will be signed out to Dr. Jaramillo


Time of 1ST Reevaluation:  15:40


Reevaluation 1ST:  Unchanged


Patient Education/Counseling:  Other (patient is altered )


Family Education/Counseling:  No Family Present





Departure 1


Departure


Time of Disposition:  17:17


Impression:  


   Primary Impression:  


   Psychotic episode


Disposition:  30 STILL A PATIENT


Condition:  Fair





Critical Care Note


Critical Care Time?:  No





Stability


Stability form required:  No





Heart Score


Heart Score:  








Heart Score Response (Comments) Value


 


History N/A 0


 


EKG N/A 0


 


Age N/A 0


 


Risk Factors N/A 0


 


Troponin N/A 0


 


Total  0














I personally scribed for SEYMOUR MARTINEZ MD ([email or web address]) on 12/2/24 at 15:29.  

Electronically submitted by Oleg Macias ([email or web address]).





SEYMOUR MARTINEZ MD               Dec 2, 2024 15:29

## 2024-12-03 VITALS
OXYGEN SATURATION: 98 % | DIASTOLIC BLOOD PRESSURE: 79 MMHG | SYSTOLIC BLOOD PRESSURE: 146 MMHG | RESPIRATION RATE: 12 BRPM | HEART RATE: 73 BPM

## 2024-12-03 NOTE — DVHINCON2
Date of Service if different f:  Dec 3, 2024


Time of Service:  09:26





Consultation ([facility])


Consulting Physician:  BRANDO AMOS MD


Labs





Laboratory Tests








Test


 12/2/24


15:25 12/2/24


19:56


 


White Blood Count


 10.8 10^3/uL


(4.4-10.8) 





 


Red Blood Count


 4.32 10^6/uL


(4.5-5.90) 





 


Hemoglobin


 13.5 g/dL


(13.5-17.5) 





 


Hematocrit


 40.2 %


(41.0-53.0) 





 


Mean Corpuscular Volume


 93.1 fL


(80.0-100.0) 





 


Mean Corpuscular Hemoglobin


 31.1 pg


(28.0-32.0) 





 


Mean Corpuscular Hemoglobin


Concent 33.5 g/dL


(32.0-36.0) 





 


Red Cell Distribution Width


 14.2 %


(11.8-14.3) 





 


Platelet Count


 347 10^3/uL


(140-450) 





 


Mean Platelet Volume


 7.7 fL


(6.9-10.8) 





 


Neutrophils (%) (Auto)


 83.3 %


(37.0-80.0) 





 


Lymphocytes (%) (Auto)


 8.0 %


(10.0-50.0) 





 


Monocytes (%) (Auto)


 6.6 %


(0.0-12.0) 





 


Eosinophils (%) (Auto)


 1.2 %


(0.0-7.0) 





 


Basophils (%) (Auto)


 0.9 %


(0.0-2.0) 





 


Neutrophils # (Auto)


 9.0 10 ^3/uL


(1.6-8.6) 





 


Lymphocytes # (Auto)


 0.9 10 ^3/uL


(0.4-5.4) 





 


Monocytes # (Auto)


 0.7 10 ^3/uL


(0-1.3) 





 


Eosinophils # (Auto)


 0.1 10 ^3/uL


(0-0.8) 





 


Basophils # (Auto)


 0.1 10 ^3/uL


(0-0.2) 





 


Nucleated Red Blood Cells 0.1 %  


 


Sodium Level


 139 mmol/L


(136-145) 





 


Potassium Level


 4.2 mmol/L


(3.5-5.1) 





 


Chloride Level


 106 mmol/L


() 





 


Carbon Dioxide Level


 24 mmol/L


(20-31) 





 


Anion Gap 9 (5-15)  


 


Blood Urea Nitrogen


 19 mg/dL


(9-23) 





 


Creatinine


 1.10 mg/dL


(0.700-1.30) 





 


Glomerular Filtration Rate


Calc 75 mL/min


(>90) 





 


BUN/Creatinine Ratio


 17.3


(10.0-20.0) 





 


Serum Glucose


 87 mg/dL


() 





 


Calcium Level


 10.1 mg/dL


(8.7-10.4) 





 


Plasma/Serum Blood Alcohol


 4.3 mg/dL


(<10) 





 


Urine Color


 


 Light-yellow


(Yellow)


 


Urine Clarity  Clear (Clear) 


 


Urine pH  6.5 (5.0-9.0) 


 


Urine Specific Gravity


 


 1.012


(1.001-1.035)


 


Urine Protein


 


 Negative


(Negative)


 


Urine Ketones


 


 Negative


(Negative)


 


Urine Blood


 


 Negative /uL


(Negative)


 


Urine Nitrite


 


 Negative


(Negative)


 


Urine Bilirubin


 


 Negative


(Negative)


 


Urine Urobilinogen


 


 Normal mg/dL


(Negative)


 


Urine Leukocyte Esterase


 


 Negative /uL


(Negative)


 


Urine RBC  1 /hpf (0 - 3) 


 


Urine WBC  1 /hpf (0 - 3) 


 


Urine Squamous Epithelial


Cells 


 None seen /hpf


(<5)


 


Urine Bacteria


 


 None seen /hpf


(None Seen)


 


Urine Glucose


 


 Normal mg/dL


(Normal)


 


Urine Opiates Screen  Neg (NEGATIVE) 


 


Urine Fentanyl Screen  Neg (NEGATIVE) 


 


Urine Barbiturates Screen  Neg (NEGATIVE) 


 


Urine Phencyclidine Screen  Neg (NEGATIVE) 


 


Urine Amphetamines Screen  Pos (NEGATIVE) 


 


Urine Benzodiazepines Screen  Neg (NEGATIVE) 


 


Urine Cocaine Screen  Neg (NEGATIVE) 


 


Urine Cannabinoids Screen  Pos (NEGATIVE) 








Appearance:  Stated age


Psychomotor activity:  WNL, Calm


Behavioral:  Cooperative


Eye contact:  Appropriate


Speech:  WNL


Affect:  Appropriate, Mood Congruent


Mood:  Euthymic


Thought processes:  Linear/Goal-directed


Thought content:  WNL


Suicidal ideations:  Absent


Homicidal ideations:  Absent


Orientation:  Person, Place, Time, Situation


Memory intact:  Recent


Intellect:  Average


Abstractability:  WNL


Concentration:  Adequate


Attention:  Adequate


Judgement:  Poor


Insight:  Poor


Vitals





Vital Signs








  Date Time  Temp Pulse Resp B/P (MAP) Pulse Ox O2 Delivery O2 Flow Rate FiO2


 


12/3/24 08:00  74      


 


12/3/24 07:30   21 140/91 (107) 97   


 


12/2/24 23:00      Room Air* 0 21


 


12/2/24 23:00 98.0       





 98.0       








Treatment plan discussed:  With staff


Medication adjusted:  No


Labs ordered:  No


Psychotherapy provided:  No


Type:  Voluntary


History of Present Illness


Reason for Consult : psychiatric evaluation





PER ED PHYSICIAN: 63 y/o M, with a Hx of anemia, HTN, and polysubstance abuse, 

is BIBA for c/o ALOC s/p seizure-like activity, today. Per EMS report, patient's

neighbor called after witnessing the patient, suddenly, laid himself next to his

vehicle, while outside, and proceeded to have a seizure, where his body 

"stiffen" then. On scene, patient was found next to his vehicle, w/o oral trauma

or incontinence, stating non-comprehensible sentences, with a GCS 11, blood 

glucose of 144, and all remaining vitals within normal limits. EMS states on 

aforementioned personal reporting on patient acting different from his normal 

baseline. Patient has no other reported associated symptoms. Further Hx cannot 

be obtain, due to patient's current altered condition and absence of 

family/caretaker historians, at time of assessment.





PSYCHIATRIST HPI:   The patient was seen and evaluated at [facility] ED via 

telepsychiatry platform. 64 yr old male BIBA after he passed out with odd 

behavior last night. On arrival in the ED, he was disoriented, confused and 

aggressive which is different from his baseline. His Urine drug screen was 

positive for marijuana and amphetamines which he admitted to using meth on an 

occasional basis. He has no recollection of his behavior or what he did 

yesterday or last night. He was able to say that he was at [facility] and that it was December 2024 and his memory appeared mostly intact 

with good attention and concentration and no current cognitive impairment. He 

reported he had been treated for bipolar disorder in the distant past but that h

e hasn't been taking any medication in the recent past. He reported he lives by 

himself and has a maid who takes him shopping and helps him clean up and take 

care of himself. He stated he has a place to live and has food that he prepares 

at home. He denied having suicidal or homicidal ideation. He denied having 

auditory or visual hallucinations or paranoia. He reported he feels comfortable 

being discharge to his residence and has no concerns.





Past Psychiatric History :  Diagnosed with bipolar disorder in the past. Some 

hospitalizations. No past suicide attempts.





Past Medical History: Hypertension, CAD





Current Medications: none (history of taking Benazepril 5mg qam and Zyprexa 5mg 

qhs in his health record, but he denied taking any medication currently)


NKDA





Substance use:   Smokes MJ occasionally. Meth use about monthly.  Denied alcohol

and other substance use. 





Social History : Lives by self with care giver who takes him shopping. 





Diagnosis:  Unspecified bipolar disorder; Meth use disorder





Formulation: This 64 yr old male appears to suffer from bipolar disorder and 

meth use disorder and appears to have been intoxicated last night which likely 

led to his bizarre behavior. He is now coherent and rational and calm.


He may benefit from follow up with mental health and being on a mood stabilizer 

such as zyprexa, but is not interested at this time.


He does not not meet criteria for involuntary hold.





Plan: 1. Safety. The patient is a low risk for self harm and may be managed as 

an outpatient.





2. Legal-voluntary.





3. Medication: Recommend zyprexa 5mg qhs





4.  Follow up with outpatient mental health for medication management and 

therapy.





5.  Case discussed with ED Physician, Avelino Hassan.


Assessment/Diagnosis/Plan


Reviewed:  Labs, Medications, Previous Orders











BRANDO AMOS MD               Dec 3, 2024 09:17